# Patient Record
(demographics unavailable — no encounter records)

---

## 2024-10-31 NOTE — HISTORY OF PRESENT ILLNESS
[de-identified] : having some anxiety or possible depression issues wants to discuss therapy  [FreeTextEntry6] : Patient is here today to discuss anxiety and mild depressive symptoms. It has been going on for awhile now. She is not sure how to cope with her symptoms.  The one thing that helps her a bit is listening to music. She does have  friends that she communicates some of her issues with but mostly keeps it to herself.  She doesnt want to eat much because she just doesnt feel right.  She states she has lost a little weight and would like to gain it back but finding it a challenge. There is no binging/purging. She has no suicidal thoughts or ideations. She is interested in starting therapy to help her.

## 2024-10-31 NOTE — DISCUSSION/SUMMARY
[FreeTextEntry1] : Names of psychiatrists were given to Kristine.  I told her she can also check her insurance provider book for therapists in her area. I will also send an email to [facility] mgmt team. Instructed to go the ER if suicidal or thoughts of harming herself. Exercise, music, calm kirill- different outlets to make her feel better.  Time spent 45 minutes